# Patient Record
Sex: FEMALE | Race: OTHER | NOT HISPANIC OR LATINO | URBAN - METROPOLITAN AREA
[De-identification: names, ages, dates, MRNs, and addresses within clinical notes are randomized per-mention and may not be internally consistent; named-entity substitution may affect disease eponyms.]

---

## 2017-04-03 ENCOUNTER — OUTPATIENT (OUTPATIENT)
Dept: OUTPATIENT SERVICES | Facility: HOSPITAL | Age: 27
LOS: 1 days | End: 2017-04-03
Payer: COMMERCIAL

## 2017-04-03 ENCOUNTER — RESULT REVIEW (OUTPATIENT)
Age: 27
End: 2017-04-03

## 2017-04-03 VITALS
HEART RATE: 66 BPM | HEIGHT: 59 IN | RESPIRATION RATE: 16 BRPM | OXYGEN SATURATION: 98 % | WEIGHT: 104.94 LBS | SYSTOLIC BLOOD PRESSURE: 98 MMHG | DIASTOLIC BLOOD PRESSURE: 60 MMHG | TEMPERATURE: 98 F

## 2017-04-03 DIAGNOSIS — Z01.818 ENCOUNTER FOR OTHER PREPROCEDURAL EXAMINATION: ICD-10-CM

## 2017-04-03 DIAGNOSIS — N83.209 UNSPECIFIED OVARIAN CYST, UNSPECIFIED SIDE: ICD-10-CM

## 2017-04-03 DIAGNOSIS — Z98.891 HISTORY OF UTERINE SCAR FROM PREVIOUS SURGERY: Chronic | ICD-10-CM

## 2017-04-03 DIAGNOSIS — N83.201 UNSPECIFIED OVARIAN CYST, RIGHT SIDE: ICD-10-CM

## 2017-04-03 PROBLEM — Z00.00 ENCOUNTER FOR PREVENTIVE HEALTH EXAMINATION: Status: ACTIVE | Noted: 2017-04-03

## 2017-04-03 LAB
ANION GAP SERPL CALC-SCNC: 7 MMOL/L — SIGNIFICANT CHANGE UP (ref 5–17)
APPEARANCE UR: CLEAR — SIGNIFICANT CHANGE UP
APTT BLD: 34.5 SEC — SIGNIFICANT CHANGE UP (ref 27.5–37.4)
BILIRUB UR-MCNC: NEGATIVE — SIGNIFICANT CHANGE UP
BUN SERPL-MCNC: 12 MG/DL — SIGNIFICANT CHANGE UP (ref 7–18)
CALCIUM SERPL-MCNC: 8.1 MG/DL — LOW (ref 8.4–10.5)
CHLORIDE SERPL-SCNC: 107 MMOL/L — SIGNIFICANT CHANGE UP (ref 96–108)
CO2 SERPL-SCNC: 24 MMOL/L — SIGNIFICANT CHANGE UP (ref 22–31)
COLOR SPEC: YELLOW — SIGNIFICANT CHANGE UP
CREAT SERPL-MCNC: 0.66 MG/DL — SIGNIFICANT CHANGE UP (ref 0.5–1.3)
DIFF PNL FLD: NEGATIVE — SIGNIFICANT CHANGE UP
GLUCOSE SERPL-MCNC: 82 MG/DL — SIGNIFICANT CHANGE UP (ref 70–99)
GLUCOSE UR QL: NEGATIVE — SIGNIFICANT CHANGE UP
HCG SERPL-ACNC: <1 MIU/ML — SIGNIFICANT CHANGE UP
HCT VFR BLD CALC: 38.3 % — SIGNIFICANT CHANGE UP (ref 34.5–45)
HGB BLD-MCNC: 12.4 G/DL — SIGNIFICANT CHANGE UP (ref 11.5–15.5)
INR BLD: 1.2 RATIO — HIGH (ref 0.88–1.16)
KETONES UR-MCNC: ABNORMAL
LEUKOCYTE ESTERASE UR-ACNC: NEGATIVE — SIGNIFICANT CHANGE UP
MCHC RBC-ENTMCNC: 28.7 PG — SIGNIFICANT CHANGE UP (ref 27–34)
MCHC RBC-ENTMCNC: 32.3 GM/DL — SIGNIFICANT CHANGE UP (ref 32–36)
MCV RBC AUTO: 88.7 FL — SIGNIFICANT CHANGE UP (ref 80–100)
NITRITE UR-MCNC: NEGATIVE — SIGNIFICANT CHANGE UP
PH UR: 6.5 — SIGNIFICANT CHANGE UP (ref 4.8–8)
PLATELET # BLD AUTO: 261 K/UL — SIGNIFICANT CHANGE UP (ref 150–400)
POTASSIUM SERPL-MCNC: 4.1 MMOL/L — SIGNIFICANT CHANGE UP (ref 3.5–5.3)
POTASSIUM SERPL-SCNC: 4.1 MMOL/L — SIGNIFICANT CHANGE UP (ref 3.5–5.3)
PROT UR-MCNC: NEGATIVE — SIGNIFICANT CHANGE UP
PROTHROM AB SERPL-ACNC: 13.1 SEC — HIGH (ref 9.8–12.7)
RBC # BLD: 4.32 M/UL — SIGNIFICANT CHANGE UP (ref 3.8–5.2)
RBC # FLD: 12.8 % — SIGNIFICANT CHANGE UP (ref 10.3–14.5)
SODIUM SERPL-SCNC: 138 MMOL/L — SIGNIFICANT CHANGE UP (ref 135–145)
SP GR SPEC: 1.01 — SIGNIFICANT CHANGE UP (ref 1.01–1.02)
UROBILINOGEN FLD QL: NEGATIVE — SIGNIFICANT CHANGE UP
WBC # BLD: 13.1 K/UL — HIGH (ref 3.8–10.5)
WBC # FLD AUTO: 13.1 K/UL — HIGH (ref 3.8–10.5)

## 2017-04-03 PROCEDURE — 80048 BASIC METABOLIC PNL TOTAL CA: CPT

## 2017-04-03 PROCEDURE — 86900 BLOOD TYPING SEROLOGIC ABO: CPT

## 2017-04-03 PROCEDURE — 85730 THROMBOPLASTIN TIME PARTIAL: CPT

## 2017-04-03 PROCEDURE — G0463: CPT

## 2017-04-03 PROCEDURE — 86850 RBC ANTIBODY SCREEN: CPT

## 2017-04-03 PROCEDURE — 81003 URINALYSIS AUTO W/O SCOPE: CPT

## 2017-04-03 PROCEDURE — 85027 COMPLETE CBC AUTOMATED: CPT

## 2017-04-03 PROCEDURE — 86901 BLOOD TYPING SEROLOGIC RH(D): CPT

## 2017-04-03 PROCEDURE — 84702 CHORIONIC GONADOTROPIN TEST: CPT

## 2017-04-03 PROCEDURE — 85610 PROTHROMBIN TIME: CPT

## 2017-04-03 RX ORDER — SODIUM CHLORIDE 9 MG/ML
3 INJECTION INTRAMUSCULAR; INTRAVENOUS; SUBCUTANEOUS EVERY 8 HOURS
Qty: 0 | Refills: 0 | Status: DISCONTINUED | OUTPATIENT
Start: 2017-04-04 | End: 2017-04-12

## 2017-04-03 NOTE — H&P PST ADULT - HISTORY OF PRESENT ILLNESS
26 years old female presented with right sided lower abdomen pain x 1 year , pt was diagnosed with right ovarian cyst and is scheduled for laparoscopic right ovarian cystectomy on 4/4/17.

## 2017-04-03 NOTE — H&P PST ADULT - LAB RESULTS AND INTERPRETATION
WBC-13.1, GYN of pt , Dr. Shelby, and Anesthesia, Dr. Garcia aware and ok , no need to repeat, ok to progress with sx

## 2017-04-03 NOTE — H&P PST ADULT - NSANTHOSAYNRD_GEN_A_CORE
No. FLEX screening performed.  STOP BANG Legend: 0-2 = LOW Risk; 3-4 = INTERMEDIATE Risk; 5-8 = HIGH Risk

## 2017-04-03 NOTE — H&P PST ADULT - NEGATIVE FEMALE-SPECIFIC SYMPTOMS
no dysmenorrhea/no vaginal discharge/no abnormal vaginal bleeding/no menorrhagia/no irregular menses/no pelvic pain/no spotting

## 2017-04-03 NOTE — H&P PST ADULT - PROBLEM SELECTOR PLAN 1
Patient is scheduled for laparoscopic right ovarian cystectomy on 4/4/17.Patient is at average risk for this surgery.

## 2017-04-03 NOTE — H&P PST ADULT - FAMILY HISTORY
Mother  Still living? Yes, Estimated age: Age Unknown  Family history of asthma, Age at diagnosis: Age Unknown

## 2017-04-04 ENCOUNTER — OUTPATIENT (OUTPATIENT)
Dept: OUTPATIENT SERVICES | Facility: HOSPITAL | Age: 27
LOS: 1 days | Discharge: HOME | End: 2017-04-04
Payer: COMMERCIAL

## 2017-04-04 ENCOUNTER — TRANSCRIPTION ENCOUNTER (OUTPATIENT)
Age: 27
End: 2017-04-04

## 2017-04-04 ENCOUNTER — APPOINTMENT (OUTPATIENT)
Dept: OBGYN | Facility: HOSPITAL | Age: 27
End: 2017-04-04

## 2017-04-04 VITALS
SYSTOLIC BLOOD PRESSURE: 108 MMHG | OXYGEN SATURATION: 100 % | RESPIRATION RATE: 14 BRPM | DIASTOLIC BLOOD PRESSURE: 52 MMHG | HEIGHT: 59 IN | WEIGHT: 104.94 LBS | TEMPERATURE: 98 F | HEART RATE: 63 BPM

## 2017-04-04 VITALS
RESPIRATION RATE: 14 BRPM | DIASTOLIC BLOOD PRESSURE: 54 MMHG | TEMPERATURE: 98 F | OXYGEN SATURATION: 100 % | HEART RATE: 83 BPM | SYSTOLIC BLOOD PRESSURE: 106 MMHG

## 2017-04-04 DIAGNOSIS — D27.0 BENIGN NEOPLASM OF RIGHT OVARY: ICD-10-CM

## 2017-04-04 DIAGNOSIS — Z01.818 ENCOUNTER FOR OTHER PREPROCEDURAL EXAMINATION: ICD-10-CM

## 2017-04-04 DIAGNOSIS — Z98.891 HISTORY OF UTERINE SCAR FROM PREVIOUS SURGERY: Chronic | ICD-10-CM

## 2017-04-04 DIAGNOSIS — Z87.440 PERSONAL HISTORY OF URINARY (TRACT) INFECTIONS: ICD-10-CM

## 2017-04-04 DIAGNOSIS — N83.209 UNSPECIFIED OVARIAN CYST, UNSPECIFIED SIDE: ICD-10-CM

## 2017-04-04 LAB — ABO RH CONFIRMATION: SIGNIFICANT CHANGE UP

## 2017-04-04 PROCEDURE — 88305 TISSUE EXAM BY PATHOLOGIST: CPT

## 2017-04-04 PROCEDURE — 58662 LAPAROSCOPY EXCISE LESIONS: CPT | Mod: RT

## 2017-04-04 PROCEDURE — 58662 LAPAROSCOPY EXCISE LESIONS: CPT

## 2017-04-04 PROCEDURE — C1765: CPT

## 2017-04-04 PROCEDURE — 88305 TISSUE EXAM BY PATHOLOGIST: CPT | Mod: 26

## 2017-04-04 RX ORDER — FENTANYL CITRATE 50 UG/ML
25 INJECTION INTRAVENOUS
Qty: 0 | Refills: 0 | Status: DISCONTINUED | OUTPATIENT
Start: 2017-04-04 | End: 2017-04-04

## 2017-04-04 RX ORDER — ONDANSETRON 8 MG/1
4 TABLET, FILM COATED ORAL ONCE
Qty: 0 | Refills: 0 | Status: DISCONTINUED | OUTPATIENT
Start: 2017-04-04 | End: 2017-04-04

## 2017-04-04 RX ORDER — SODIUM CHLORIDE 9 MG/ML
1000 INJECTION, SOLUTION INTRAVENOUS
Qty: 0 | Refills: 0 | Status: DISCONTINUED | OUTPATIENT
Start: 2017-04-04 | End: 2017-04-04

## 2017-04-04 RX ORDER — KETOROLAC TROMETHAMINE 30 MG/ML
30 SYRINGE (ML) INJECTION ONCE
Qty: 0 | Refills: 0 | Status: DISCONTINUED | OUTPATIENT
Start: 2017-04-04 | End: 2017-04-04

## 2017-04-04 RX ORDER — IBUPROFEN 200 MG
1 TABLET ORAL
Qty: 40 | Refills: 0 | OUTPATIENT
Start: 2017-04-04 | End: 2017-04-14

## 2017-04-04 RX ORDER — HYDROMORPHONE HYDROCHLORIDE 2 MG/ML
0.5 INJECTION INTRAMUSCULAR; INTRAVENOUS; SUBCUTANEOUS
Qty: 0 | Refills: 0 | Status: DISCONTINUED | OUTPATIENT
Start: 2017-04-04 | End: 2017-04-04

## 2017-04-04 RX ADMIN — SODIUM CHLORIDE 3 MILLILITER(S): 9 INJECTION INTRAMUSCULAR; INTRAVENOUS; SUBCUTANEOUS at 11:12

## 2017-04-04 NOTE — ASU DISCHARGE PLAN (ADULT/PEDIATRIC). - ACTIVITY LEVEL
no tampons/no weight bearing/weight bearing as tolerated/no intercourse/no tub baths/nothing per vagina/no sports/gym/no douching/no heavy lifting no sports/gym/no intercourse/No foreign objects/no tampons/no heavy lifting/nothing per rectum/no tub baths/no douching/nothing per vagina/no weight bearing/weight bearing as tolerated

## 2017-04-04 NOTE — ASU DISCHARGE PLAN (ADULT/PEDIATRIC). - ITEMS TO FOLLOWUP WITH YOUR PHYSICIAN'S
Nothing in vagina, no sex no tampons.  No heavy lifting,  no pushing,  ambulation daily as tolerated. Shower daily, clean wound well and keep dry after; see your gynecologist in 1-2wks for follow up

## 2017-04-04 NOTE — ASU DISCHARGE PLAN (ADULT/PEDIATRIC). - MEDICATION SUMMARY - MEDICATIONS TO TAKE
I will START or STAY ON the medications listed below when I get home from the hospital:    ibuprofen 600 mg oral tablet  -- 1 tab(s) by mouth every 6 hours  -- Do not take this drug if you are pregnant.  It is very important that you take or use this exactly as directed.  Do not skip doses or discontinue unless directed by your doctor.  May cause drowsiness or dizziness.  Obtain medical advice before taking any non-prescription drugs as some may affect the action of this medication.  Take with food or milk.    -- Indication: For Pain, as needed

## 2017-04-04 NOTE — ASU DISCHARGE PLAN (ADULT/PEDIATRIC). - NOTIFY
GYN Fever>100.4/Increased Irritability or Sluggishness/Unable to Urinate/Persistent Nausea and Vomiting/Bleeding that does not stop/Pain not relieved by Medications/Numbness, color, or temperature change to extremity/Inability to Tolerate Liquids or Foods

## 2019-04-22 ENCOUNTER — TRANSCRIPTION ENCOUNTER (OUTPATIENT)
Age: 29
End: 2019-04-22

## 2021-08-25 ENCOUNTER — TRANSCRIPTION ENCOUNTER (OUTPATIENT)
Age: 31
End: 2021-08-25

## 2021-09-15 ENCOUNTER — TRANSCRIPTION ENCOUNTER (OUTPATIENT)
Age: 31
End: 2021-09-15

## 2021-10-11 ENCOUNTER — TRANSCRIPTION ENCOUNTER (OUTPATIENT)
Age: 31
End: 2021-10-11

## 2022-09-20 ENCOUNTER — NON-APPOINTMENT (OUTPATIENT)
Age: 32
End: 2022-09-20

## 2024-03-12 ENCOUNTER — NON-APPOINTMENT (OUTPATIENT)
Age: 34
End: 2024-03-12

## 2024-05-13 ENCOUNTER — NON-APPOINTMENT (OUTPATIENT)
Age: 34
End: 2024-05-13

## 2025-05-26 ENCOUNTER — EMERGENCY (EMERGENCY)
Facility: HOSPITAL | Age: 35
LOS: 1 days | End: 2025-05-26
Attending: EMERGENCY MEDICINE
Payer: COMMERCIAL

## 2025-05-26 VITALS
HEART RATE: 69 BPM | DIASTOLIC BLOOD PRESSURE: 70 MMHG | WEIGHT: 132.28 LBS | SYSTOLIC BLOOD PRESSURE: 106 MMHG | OXYGEN SATURATION: 98 % | RESPIRATION RATE: 19 BRPM | TEMPERATURE: 98 F

## 2025-05-26 DIAGNOSIS — Z98.891 HISTORY OF UTERINE SCAR FROM PREVIOUS SURGERY: Chronic | ICD-10-CM

## 2025-05-26 LAB
ALBUMIN SERPL ELPH-MCNC: 3.8 G/DL — SIGNIFICANT CHANGE UP (ref 3.5–5)
ALP SERPL-CCNC: 58 U/L — SIGNIFICANT CHANGE UP (ref 40–120)
ALT FLD-CCNC: 32 U/L DA — SIGNIFICANT CHANGE UP (ref 10–60)
ANION GAP SERPL CALC-SCNC: 4 MMOL/L — LOW (ref 5–17)
APPEARANCE UR: CLEAR — SIGNIFICANT CHANGE UP
AST SERPL-CCNC: 17 U/L — SIGNIFICANT CHANGE UP (ref 10–40)
BACTERIA # UR AUTO: NEGATIVE /HPF — SIGNIFICANT CHANGE UP
BASOPHILS # BLD AUTO: 0.11 K/UL — SIGNIFICANT CHANGE UP (ref 0–0.2)
BASOPHILS NFR BLD AUTO: 0.7 % — SIGNIFICANT CHANGE UP (ref 0–2)
BILIRUB SERPL-MCNC: 0.3 MG/DL — SIGNIFICANT CHANGE UP (ref 0.2–1.2)
BILIRUB UR-MCNC: NEGATIVE — SIGNIFICANT CHANGE UP
BUN SERPL-MCNC: 15 MG/DL — SIGNIFICANT CHANGE UP (ref 7–18)
CALCIUM SERPL-MCNC: 9.2 MG/DL — SIGNIFICANT CHANGE UP (ref 8.4–10.5)
CHLORIDE SERPL-SCNC: 104 MMOL/L — SIGNIFICANT CHANGE UP (ref 96–108)
CO2 SERPL-SCNC: 26 MMOL/L — SIGNIFICANT CHANGE UP (ref 22–31)
COLOR SPEC: YELLOW — SIGNIFICANT CHANGE UP
CREAT SERPL-MCNC: 0.81 MG/DL — SIGNIFICANT CHANGE UP (ref 0.5–1.3)
DIFF PNL FLD: NEGATIVE — SIGNIFICANT CHANGE UP
EGFR: 97 ML/MIN/1.73M2 — SIGNIFICANT CHANGE UP
EGFR: 97 ML/MIN/1.73M2 — SIGNIFICANT CHANGE UP
EOSINOPHIL # BLD AUTO: 0.53 K/UL — HIGH (ref 0–0.5)
EOSINOPHIL NFR BLD AUTO: 3.5 % — SIGNIFICANT CHANGE UP (ref 0–6)
EPI CELLS # UR: PRESENT
GLUCOSE SERPL-MCNC: 94 MG/DL — SIGNIFICANT CHANGE UP (ref 70–99)
GLUCOSE UR QL: NEGATIVE MG/DL — SIGNIFICANT CHANGE UP
HCG SERPL-ACNC: <1 MIU/ML — SIGNIFICANT CHANGE UP
HCT VFR BLD CALC: 36.5 % — SIGNIFICANT CHANGE UP (ref 34.5–45)
HGB BLD-MCNC: 12 G/DL — SIGNIFICANT CHANGE UP (ref 11.5–15.5)
IMM GRANULOCYTES NFR BLD AUTO: 0.3 % — SIGNIFICANT CHANGE UP (ref 0–0.9)
KETONES UR QL: NEGATIVE MG/DL — SIGNIFICANT CHANGE UP
LACTATE SERPL-SCNC: 0.7 MMOL/L — SIGNIFICANT CHANGE UP (ref 0.7–2)
LEUKOCYTE ESTERASE UR-ACNC: ABNORMAL
LIDOCAIN IGE QN: 87 U/L — HIGH (ref 13–75)
LYMPHOCYTES # BLD AUTO: 30.5 % — SIGNIFICANT CHANGE UP (ref 13–44)
LYMPHOCYTES # BLD AUTO: 4.6 K/UL — HIGH (ref 1–3.3)
MAGNESIUM SERPL-MCNC: 2.3 MG/DL — SIGNIFICANT CHANGE UP (ref 1.6–2.6)
MCHC RBC-ENTMCNC: 28.2 PG — SIGNIFICANT CHANGE UP (ref 27–34)
MCHC RBC-ENTMCNC: 32.9 G/DL — SIGNIFICANT CHANGE UP (ref 32–36)
MCV RBC AUTO: 85.7 FL — SIGNIFICANT CHANGE UP (ref 80–100)
MONOCYTES # BLD AUTO: 1.14 K/UL — HIGH (ref 0–0.9)
MONOCYTES NFR BLD AUTO: 7.6 % — SIGNIFICANT CHANGE UP (ref 2–14)
NEUTROPHILS # BLD AUTO: 8.66 K/UL — HIGH (ref 1.8–7.4)
NEUTROPHILS NFR BLD AUTO: 57.4 % — SIGNIFICANT CHANGE UP (ref 43–77)
NITRITE UR-MCNC: NEGATIVE — SIGNIFICANT CHANGE UP
NRBC BLD AUTO-RTO: 0 /100 WBCS — SIGNIFICANT CHANGE UP (ref 0–0)
PH UR: 6.5 — SIGNIFICANT CHANGE UP (ref 5–8)
PLATELET # BLD AUTO: 300 K/UL — SIGNIFICANT CHANGE UP (ref 150–400)
POTASSIUM SERPL-MCNC: 4.2 MMOL/L — SIGNIFICANT CHANGE UP (ref 3.5–5.3)
POTASSIUM SERPL-SCNC: 4.2 MMOL/L — SIGNIFICANT CHANGE UP (ref 3.5–5.3)
PROT SERPL-MCNC: 7.8 G/DL — SIGNIFICANT CHANGE UP (ref 6–8.3)
PROT UR-MCNC: NEGATIVE MG/DL — SIGNIFICANT CHANGE UP
RBC # BLD: 4.26 M/UL — SIGNIFICANT CHANGE UP (ref 3.8–5.2)
RBC # FLD: 14.3 % — SIGNIFICANT CHANGE UP (ref 10.3–14.5)
RBC CASTS # UR COMP ASSIST: SIGNIFICANT CHANGE UP /HPF
SODIUM SERPL-SCNC: 134 MMOL/L — LOW (ref 135–145)
SP GR SPEC: 1.01 — SIGNIFICANT CHANGE UP (ref 1–1.03)
UROBILINOGEN FLD QL: 0.2 MG/DL — SIGNIFICANT CHANGE UP (ref 0.2–1)
WBC # BLD: 15.09 K/UL — HIGH (ref 3.8–10.5)
WBC # FLD AUTO: 15.09 K/UL — HIGH (ref 3.8–10.5)
WBC UR QL: 2 /HPF — SIGNIFICANT CHANGE UP (ref 0–5)

## 2025-05-26 PROCEDURE — 99285 EMERGENCY DEPT VISIT HI MDM: CPT

## 2025-05-26 RX ORDER — KETOROLAC TROMETHAMINE 30 MG/ML
15 INJECTION, SOLUTION INTRAMUSCULAR; INTRAVENOUS ONCE
Refills: 0 | Status: DISCONTINUED | OUTPATIENT
Start: 2025-05-26 | End: 2025-05-26

## 2025-05-26 RX ADMIN — Medication 1000 MILLILITER(S): at 23:26

## 2025-05-26 RX ADMIN — KETOROLAC TROMETHAMINE 15 MILLIGRAM(S): 30 INJECTION, SOLUTION INTRAMUSCULAR; INTRAVENOUS at 23:25

## 2025-05-26 NOTE — ED PROVIDER NOTE - CLINICAL SUMMARY MEDICAL DECISION MAKING FREE TEXT BOX
34-year-old female complaining of on and off lower pelvic pain since yesterday, concern for ovarian cyst, torsion, cystitis, appendicitis which is unlikely.  Will get labs, sono, possible CT, will give Toradol and IV fluid and reassess 34-year-old female complaining of on and off lower pelvic pain since yesterday, concern for ovarian cyst, torsion, cystitis, appendicitis which is unlikely.  Will get labs, sono, possible CT, will give Toradol and IV fluid and reassess    1:44a Labs/CT/sono result explained to  and patient  No evidence of ovarian torsion. IUD identified in appropriate position.    3.9 cm simple appearing right ovarian cyst.  CT A/P-LIVER: Indeterminate segment 3 hypodensity measures up to 1.9 cm and   demonstrates some irregular internal contrast enhancement; consider   outpatient contrast-enhanced MRI liver mass protocol, if clinically   indicated. Diffuse steatosis also noted.   patient with ovarian cyst and possible liver mass   advised to follow-up with GYN and PCP to get MRI

## 2025-05-26 NOTE — ED PROVIDER NOTE - NSFOLLOWUPINSTRUCTIONS_ED_ALL_ED_FT
Quiste ovárico  Ovarian Cyst  The female reproductive system, with a close-up of the ovaries and an ovarian cyst.  Un quiste ovárico es mehreen bolsa llena de líquido que se forma en el ovario. Los ovarios son órganos pequeños que producen óvulos en las mujeres. Se pueden formar muchos tipos de quistes en los ovarios. En crum mayoría, estos quistes desaparecen solos y no son cancerosos. Otros quistes necesitan tratamiento.    ¿Cuáles son las causas?  Algunas de las causas de los quistes ováricos son las siguientes:  Cambios químicos u hormonales en el cuerpo esau el período mensual normal.  Síndrome del ovario poliquístico (SOP). Cecile es un problema hormonal frecuente.  Endometriosis. El tejido que recubre el interior del útero crece fuera de cecile órgano. Si crece en los ovarios, puede formar quistes.  Embarazo. El cuerpo produce más hormonas de lo normal para sustentar el embarazo. [place] puede formar quistes.  Infección. Mehreen infección en el útero puede diseminarse a los ovarios y formar quistes.  ¿Qué incrementa el riesgo?  Es más probable que tenga esta afección si:  Maribel medicamentos para ayudarla a quedar embarazada.  Tiene familiares que tienen quistes ováricos.  ¿Cuáles son los signos o síntomas?  Muchos quistes ováricos no causan síntomas. Si tiene síntomas, estos pueden incluir los siguientes:  Dolor o presión alrededor de la pelvis. La pelvis es la corey que se encuentra entre los huesos de la cadera.  Dolor en el abdomen bajo.  Dolor al tener sexo.  Hinchazón en el abdomen bajo.  Períodos que no se producen en el mismo momento todos los meses.  Dolor esau el período.  ¿Cómo se diagnostica?  Estos quistes se descubren esau un examen de rutina de la pelvis. Puede realizarle otros estudios para obtener más información sobre los quistes. Los estudios incluyen mehreen exploración por tomografía computarizada (TC), mehreen resonancia magnética (RM) y análisis de brandon.    ¿Cómo se trata?  Muchos de los quistes ováricos desaparecen por sí solos, sin tratamiento. Si se necesita tratamiento, cecile puede incluir lo siguiente:  Medicamentos para ayudar a aliviar el dolor.  Procedimiento para drenar el quiste.  Cirugía para extraer el quiste.  Hormonas o píldoras anticonceptivas.  Cirugía para extraer el ovario.  Siga estas instrucciones en crum casa:  [place] todos los medicamentos solamente cici se lo haya indicado crum médico.  Si se lo indican, hágase pruebas de Papanicolaou y exámenes regulares de la pelvis.  No fume, vapee ni consuma nicotina o tabaco.  Retome vicky actividades normales cuando el médico le autorice hacerlo.  Concurra a todas las visitas de seguimiento. Es probable que el médico quiera controlar el quiste esau 2 o 3 meses para kenya si se produce algún cambio.  Si está en la menopausia, es importante que le controlen el quiste de cerca porque las mujeres de cecile vinay de edad tienen mehreen mayor tasa de cáncer de ovario.  Comuníquese con un médico si:  Aparecen nuevos síntomas.  Vicky síntomas empeoran.  Solicite ayuda de inmediato si:  Siente un dolor muy intenso en el vientre o la pelvis, y el dolor aparece de repente.  Tiene sangrado abundante que desborda más de mehreen toalla sanitaria por hora.  Esta información no tiene cici fin reemplazar el consejo del médico. Asegúrese de hacerle al médico cualquier pregunta que tenga.      You have an abnormal finding in your liver   you must follow-up with your medical doctor and get MRI liver mass protocol   low sugar diet-you have diffuse fatty liver   he also need to follow-up with your gynecologist regarding your ovarian cyst   drink plenty of fluids Quiste ovárico  Ovarian Cyst  The female reproductive system, with a close-up of the ovaries and an ovarian cyst.  Un quiste ovárico es mehreen bolsa llena de líquido que se forma en el ovario. Los ovarios son órganos pequeños que producen óvulos en las mujeres. Se pueden formar muchos tipos de quistes en los ovarios. En crum mayoría, estos quistes desaparecen solos y no son cancerosos. Otros quistes necesitan tratamiento.    ¿Cuáles son las causas?  Algunas de las causas de los quistes ováricos son las siguientes:  Cambios químicos u hormonales en el cuerpo esau el período mensual normal.  Síndrome del ovario poliquístico (SOP). Cecile es un problema hormonal frecuente.  Endometriosis. El tejido que recubre el interior del útero crece fuera de cecile órgano. Si crece en los ovarios, puede formar quistes.  Embarazo. El cuerpo produce más hormonas de lo normal para sustentar el embarazo. [place] puede formar quistes.  Infección. Mehreen infección en el útero puede diseminarse a los ovarios y formar quistes.  ¿Qué incrementa el riesgo?  Es más probable que tenga esta afección si:  Maribel medicamentos para ayudarla a quedar embarazada.  Tiene familiares que tienen quistes ováricos.  ¿Cuáles son los signos o síntomas?  Muchos quistes ováricos no causan síntomas. Si tiene síntomas, estos pueden incluir los siguientes:  Dolor o presión alrededor de la pelvis. La pelvis es la corey que se encuentra entre los huesos de la cadera.  Dolor en el abdomen bajo.  Dolor al tener sexo.  Hinchazón en el abdomen bajo.  Períodos que no se producen en el mismo momento todos los meses.  Dolor esau el período.  ¿Cómo se diagnostica?  Estos quistes se descubren esau un examen de rutina de la pelvis. Puede realizarle otros estudios para obtener más información sobre los quistes. Los estudios incluyen mehreen exploración por tomografía computarizada (TC), mehreen resonancia magnética (RM) y análisis de brandon.    ¿Cómo se trata?  Muchos de los quistes ováricos desaparecen por sí solos, sin tratamiento. Si se necesita tratamiento, cecile puede incluir lo siguiente:  Medicamentos para ayudar a aliviar el dolor.  Procedimiento para drenar el quiste.  Cirugía para extraer el quiste.  Hormonas o píldoras anticonceptivas.  Cirugía para extraer el ovario.  Siga estas instrucciones en crum casa:  [place] todos los medicamentos solamente cici se lo haya indicado crum médico.  Si se lo indican, hágase pruebas de Papanicolaou y exámenes regulares de la pelvis.  No fume, vapee ni consuma nicotina o tabaco.  Retome vicky actividades normales cuando el médico le autorice hacerlo.  Concurra a todas las visitas de seguimiento. Es probable que el médico quiera controlar el quiste esau 2 o 3 meses para kenya si se produce algún cambio.  Si está en la menopausia, es importante que le controlen el quiste de cerca porque las mujeres de cecile vinay de edad tienen mehreen mayor tasa de cáncer de ovario.  Comuníquese con un médico si:  Aparecen nuevos síntomas.  Vicky síntomas empeoran.  Solicite ayuda de inmediato si:  Siente un dolor muy intenso en el vientre o la pelvis, y el dolor aparece de repente.  Tiene sangrado abundante que desborda más de mehreen toalla sanitaria por hora.  Esta información no tiene cici fin reemplazar el consejo del médico. Asegúrese de hacerle al médico cualquier pregunta que tenga.      You have an abnormal finding in your liver   you must follow-up with your medical doctor and get MRI liver mass protocol   low sugar diet-you have diffuse fatty liver   he also need to follow-up with your gynecologist regarding your ovarian cyst   drink plenty of fluids  take Naprosyn 1 tab 2 times a day as needed for pain with food

## 2025-05-26 NOTE — ED ADULT TRIAGE NOTE - INTERNATIONAL TRAVEL
Duplicate encounter   
Patient called and scheduled physical for 11/9/2020. Patients labs will need to be ordered. Patient said she would like a call when completed so she knows when she can come in to have them done.   
No

## 2025-05-26 NOTE — ED PROVIDER NOTE - PATIENT PORTAL LINK FT
You can access the FollowMyHealth Patient Portal offered by [facility] by registering at the following website: [email or web address]. By joining [facility]’s FollowMyHealth portal, you will also be able to view your health information using other applications (apps) compatible with our system.

## 2025-05-26 NOTE — ED PROVIDER NOTE - CARE PLAN
1 Principal Discharge DX:	Abdominal pain  Secondary Diagnosis:	Ovarian cyst   Principal Discharge DX:	Abdominal pain  Secondary Diagnosis:	Ovarian cyst  Secondary Diagnosis:	Fatty liver

## 2025-05-26 NOTE — ED PROVIDER NOTE - OBJECTIVE STATEMENT
34-year-old female with no PMH, [id number], bilateral ovarian cystectomy 6 years ago, LMP 3 weeks ago.  Patient's  translates.  Patient complaining of on and off nonradiating lower pelvic pain since yesterday.  Pain is worse with movement and coughing.  Patient describes pain as stabbing 8/10.  She denies N/V, fever, chills, dysuria, hematuria, abnormal vaginal discharge.  Last BM today, no BRBPR.  Patient took nothing for her pain

## 2025-05-26 NOTE — ED ADULT NURSE NOTE - NSFALLUNIVINTERV_ED_ALL_ED
Bed/Stretcher in lowest position, wheels locked, appropriate side rails in place/Call bell, personal items and telephone in reach/Instruct patient to call for assistance before getting out of bed/chair/stretcher/Non-slip footwear applied when patient is off stretcher/[place] to call system/Physically safe environment - no spills, clutter or unnecessary equipment/Purposeful proactive rounding/Room/bathroom lighting operational, light cord in reach

## 2025-05-26 NOTE — ED ADULT NURSE NOTE - NSFALLRISKASMT_ED_ALL_ED_DT
Problem: At Risk for Falls  Goal: # Patient does not fall  Outcome: Outcome Met, Continue evaluating goal progress toward completion  Goal: # Takes action to control fall-related risks  Outcome: Outcome Met, Continue evaluating goal progress toward completion  Goal: # Verbalizes understanding of fall risk/precautions  Description: Document education using the patient education activity  Outcome: Outcome Met, Continue evaluating goal progress toward completion     Problem: At Risk for Injury Due to Fall  Goal: # Patient does not fall  Outcome: Outcome Met, Continue evaluating goal progress toward completion  Goal: # Takes action to control condition specific risks  Outcome: Outcome Met, Continue evaluating goal progress toward completion  Goal: # Verbalizes understanding of fall-related injury personal risks  Description: Document education using the patient education activity  Outcome: Outcome Met, Continue evaluating goal progress toward completion     Problem:  Activity Intolerance  Goal: # Functional status is maintained or returned to baseline  Outcome: Outcome Met, Continue evaluating goal progress toward completion  Goal: # Tolerates activity for d/c setting with no clinical problems  Outcome: Outcome Met, Continue evaluating goal progress toward completion 26-May-2025 21:49

## 2025-05-27 VITALS
RESPIRATION RATE: 18 BRPM | HEART RATE: 75 BPM | OXYGEN SATURATION: 97 % | TEMPERATURE: 97 F | SYSTOLIC BLOOD PRESSURE: 106 MMHG | DIASTOLIC BLOOD PRESSURE: 68 MMHG

## 2025-05-27 PROBLEM — N39.0 URINARY TRACT INFECTION, SITE NOT SPECIFIED: Chronic | Status: ACTIVE | Noted: 2017-04-03

## 2025-05-27 PROCEDURE — 76856 US EXAM PELVIC COMPLETE: CPT | Mod: 26

## 2025-05-27 PROCEDURE — 96374 THER/PROPH/DIAG INJ IV PUSH: CPT | Mod: XU

## 2025-05-27 PROCEDURE — 87086 URINE CULTURE/COLONY COUNT: CPT

## 2025-05-27 PROCEDURE — 83735 ASSAY OF MAGNESIUM: CPT

## 2025-05-27 PROCEDURE — 76856 US EXAM PELVIC COMPLETE: CPT

## 2025-05-27 PROCEDURE — 99284 EMERGENCY DEPT VISIT MOD MDM: CPT | Mod: 25

## 2025-05-27 PROCEDURE — 74177 CT ABD & PELVIS W/CONTRAST: CPT

## 2025-05-27 PROCEDURE — 36415 COLL VENOUS BLD VENIPUNCTURE: CPT

## 2025-05-27 PROCEDURE — 81001 URINALYSIS AUTO W/SCOPE: CPT

## 2025-05-27 PROCEDURE — 84702 CHORIONIC GONADOTROPIN TEST: CPT

## 2025-05-27 PROCEDURE — 76830 TRANSVAGINAL US NON-OB: CPT | Mod: 26

## 2025-05-27 PROCEDURE — 83605 ASSAY OF LACTIC ACID: CPT

## 2025-05-27 PROCEDURE — 85025 COMPLETE CBC W/AUTO DIFF WBC: CPT

## 2025-05-27 PROCEDURE — 80053 COMPREHEN METABOLIC PANEL: CPT

## 2025-05-27 PROCEDURE — 83690 ASSAY OF LIPASE: CPT

## 2025-05-27 PROCEDURE — 76830 TRANSVAGINAL US NON-OB: CPT

## 2025-05-27 PROCEDURE — 74177 CT ABD & PELVIS W/CONTRAST: CPT | Mod: 26

## 2025-05-27 RX ORDER — NAPROXEN SODIUM 275 MG
1 TABLET ORAL
Qty: 14 | Refills: 0
Start: 2025-05-27 | End: 2025-06-02

## 2025-05-27 RX ADMIN — Medication 150 MILLILITER(S): at 01:03

## 2025-05-28 LAB
CULTURE RESULTS: SIGNIFICANT CHANGE UP
SPECIMEN SOURCE: SIGNIFICANT CHANGE UP